# Patient Record
Sex: MALE | Race: BLACK OR AFRICAN AMERICAN | NOT HISPANIC OR LATINO | Employment: FULL TIME | URBAN - METROPOLITAN AREA
[De-identification: names, ages, dates, MRNs, and addresses within clinical notes are randomized per-mention and may not be internally consistent; named-entity substitution may affect disease eponyms.]

---

## 2023-02-01 ENCOUNTER — HOSPITAL ENCOUNTER (EMERGENCY)
Facility: HOSPITAL | Age: 33
Discharge: HOME | End: 2023-02-01
Attending: EMERGENCY MEDICINE | Admitting: EMERGENCY MEDICINE

## 2023-02-01 VITALS
HEART RATE: 108 BPM | SYSTOLIC BLOOD PRESSURE: 168 MMHG | HEIGHT: 76 IN | BODY MASS INDEX: 38.36 KG/M2 | DIASTOLIC BLOOD PRESSURE: 68 MMHG | RESPIRATION RATE: 16 BRPM | WEIGHT: 315 LBS | OXYGEN SATURATION: 96 % | TEMPERATURE: 98.6 F

## 2023-02-01 DIAGNOSIS — L08.9 WOUND INFECTION: ICD-10-CM

## 2023-02-01 DIAGNOSIS — T25.212A PARTIAL THICKNESS BURN OF LEFT ANKLE, INITIAL ENCOUNTER: ICD-10-CM

## 2023-02-01 DIAGNOSIS — T14.8XXA WOUND INFECTION: ICD-10-CM

## 2023-02-01 PROCEDURE — 250N000009 HC RX 250: Performed by: EMERGENCY MEDICINE

## 2023-02-01 PROCEDURE — 250N000013 HC RX MED GY IP 250 OP 250 PS 637: Performed by: EMERGENCY MEDICINE

## 2023-02-01 PROCEDURE — 99283 EMERGENCY DEPT VISIT LOW MDM: CPT

## 2023-02-01 RX ORDER — CEPHALEXIN 500 MG/1
500 CAPSULE ORAL 3 TIMES DAILY
Qty: 21 CAPSULE | Refills: 0 | Status: SHIPPED | OUTPATIENT
Start: 2023-02-01 | End: 2023-09-03

## 2023-02-01 RX ORDER — CEPHALEXIN 500 MG/1
500 CAPSULE ORAL 3 TIMES DAILY
Qty: 21 CAPSULE | Refills: 0 | Status: SHIPPED | OUTPATIENT
Start: 2023-02-01 | End: 2023-02-01

## 2023-02-01 RX ORDER — SILVER SULFADIAZINE 10 MG/G
1 CREAM TOPICAL ONCE
Status: COMPLETED | OUTPATIENT
Start: 2023-02-01 | End: 2023-02-01

## 2023-02-01 RX ORDER — CEPHALEXIN 500 MG/1
500 CAPSULE ORAL ONCE
Status: COMPLETED | OUTPATIENT
Start: 2023-02-01 | End: 2023-02-01

## 2023-02-01 RX ORDER — GINSENG 100 MG
CAPSULE ORAL ONCE
Status: COMPLETED | OUTPATIENT
Start: 2023-02-01 | End: 2023-02-01

## 2023-02-01 RX ADMIN — CEPHALEXIN 500 MG: 500 CAPSULE ORAL at 22:26

## 2023-02-01 RX ADMIN — BACITRACIN: 500 OINTMENT TOPICAL at 22:26

## 2023-02-01 RX ADMIN — SILVER SULFADIAZINE 1 G: 10 CREAM TOPICAL at 22:26

## 2023-02-01 ASSESSMENT — ENCOUNTER SYMPTOMS
FEVER: 0
CHILLS: 0
WOUND: 1
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
## 2023-02-02 NOTE — DISCHARGE INSTRUCTIONS
Please take antibiotics as prescribed.  Continue to keep the area clean, bandaged.  You may use Neosporin or bacitracin on the burn area.  Continue to monitor for any signs of worsening infection.  Return for any new or worsening symptoms.

## 2023-09-03 ENCOUNTER — APPOINTMENT (OUTPATIENT)
Dept: CT IMAGING | Facility: HOSPITAL | Age: 33
End: 2023-09-03
Attending: STUDENT IN AN ORGANIZED HEALTH CARE EDUCATION/TRAINING PROGRAM

## 2023-09-03 ENCOUNTER — APPOINTMENT (OUTPATIENT)
Dept: RADIOLOGY | Facility: HOSPITAL | Age: 33
End: 2023-09-03
Attending: STUDENT IN AN ORGANIZED HEALTH CARE EDUCATION/TRAINING PROGRAM

## 2023-09-03 ENCOUNTER — HOSPITAL ENCOUNTER (EMERGENCY)
Facility: HOSPITAL | Age: 33
Discharge: HOME | End: 2023-09-03
Attending: STUDENT IN AN ORGANIZED HEALTH CARE EDUCATION/TRAINING PROGRAM | Admitting: STUDENT IN AN ORGANIZED HEALTH CARE EDUCATION/TRAINING PROGRAM

## 2023-09-03 VITALS
RESPIRATION RATE: 26 BRPM | WEIGHT: 315 LBS | HEART RATE: 96 BPM | HEIGHT: 72 IN | SYSTOLIC BLOOD PRESSURE: 132 MMHG | OXYGEN SATURATION: 95 % | DIASTOLIC BLOOD PRESSURE: 72 MMHG | BODY MASS INDEX: 42.66 KG/M2 | TEMPERATURE: 98.7 F

## 2023-09-03 DIAGNOSIS — M54.50 ACUTE BILATERAL LOW BACK PAIN, UNSPECIFIED WHETHER SCIATICA PRESENT: ICD-10-CM

## 2023-09-03 LAB
ALBUMIN SERPL BCG-MCNC: 4.1 G/DL (ref 3.5–5.2)
ALBUMIN UR-MCNC: 20 MG/DL
ALP SERPL-CCNC: 56 U/L (ref 40–129)
ALT SERPL W P-5'-P-CCNC: 32 U/L (ref 0–70)
ANION GAP SERPL CALCULATED.3IONS-SCNC: 11 MMOL/L (ref 7–15)
APPEARANCE UR: CLEAR
AST SERPL W P-5'-P-CCNC: 34 U/L (ref 0–45)
BACTERIA #/AREA URNS HPF: ABNORMAL /HPF
BASOPHILS # BLD AUTO: 0 10E3/UL (ref 0–0.2)
BASOPHILS NFR BLD AUTO: 1 %
BILIRUB SERPL-MCNC: 0.2 MG/DL
BILIRUB UR QL STRIP: NEGATIVE
BUN SERPL-MCNC: 12.2 MG/DL (ref 6–20)
CALCIUM SERPL-MCNC: 9.2 MG/DL (ref 8.6–10)
CHLORIDE SERPL-SCNC: 103 MMOL/L (ref 98–107)
COLOR UR AUTO: YELLOW
CREAT SERPL-MCNC: 0.93 MG/DL (ref 0.67–1.17)
DEPRECATED HCO3 PLAS-SCNC: 28 MMOL/L (ref 22–29)
EOSINOPHIL # BLD AUTO: 0.2 10E3/UL (ref 0–0.7)
EOSINOPHIL NFR BLD AUTO: 2 %
ERYTHROCYTE [DISTWIDTH] IN BLOOD BY AUTOMATED COUNT: 14.2 % (ref 10–15)
GFR SERPL CREATININE-BSD FRML MDRD: >90 ML/MIN/1.73M2
GLUCOSE SERPL-MCNC: 106 MG/DL (ref 70–99)
GLUCOSE UR STRIP-MCNC: NEGATIVE MG/DL
HCT VFR BLD AUTO: 44 % (ref 40–53)
HGB BLD-MCNC: 14.1 G/DL (ref 13.3–17.7)
HGB UR QL STRIP: NEGATIVE
HYALINE CASTS: 1 /LPF
IMM GRANULOCYTES # BLD: 0 10E3/UL
IMM GRANULOCYTES NFR BLD: 0 %
KETONES UR STRIP-MCNC: ABNORMAL MG/DL
LEUKOCYTE ESTERASE UR QL STRIP: ABNORMAL
LYMPHOCYTES # BLD AUTO: 2.6 10E3/UL (ref 0.8–5.3)
LYMPHOCYTES NFR BLD AUTO: 33 %
MCH RBC QN AUTO: 27.2 PG (ref 26.5–33)
MCHC RBC AUTO-ENTMCNC: 32 G/DL (ref 31.5–36.5)
MCV RBC AUTO: 85 FL (ref 78–100)
MONOCYTES # BLD AUTO: 0.4 10E3/UL (ref 0–1.3)
MONOCYTES NFR BLD AUTO: 6 %
MUCOUS THREADS #/AREA URNS LPF: PRESENT /LPF
NEUTROPHILS # BLD AUTO: 4.6 10E3/UL (ref 1.6–8.3)
NEUTROPHILS NFR BLD AUTO: 58 %
NITRATE UR QL: NEGATIVE
NRBC # BLD AUTO: 0 10E3/UL
NRBC BLD AUTO-RTO: 0 /100
PH UR STRIP: 6 [PH] (ref 5–7)
PLATELET # BLD AUTO: 341 10E3/UL (ref 150–450)
POTASSIUM SERPL-SCNC: 4.4 MMOL/L (ref 3.4–5.3)
PROT SERPL-MCNC: 7.4 G/DL (ref 6.4–8.3)
RBC # BLD AUTO: 5.18 10E6/UL (ref 4.4–5.9)
RBC URINE: 5 /HPF
SODIUM SERPL-SCNC: 142 MMOL/L (ref 136–145)
SP GR UR STRIP: 1.04 (ref 1–1.03)
SQUAMOUS EPITHELIAL: 1 /HPF
TROPONIN T SERPL HS-MCNC: <6 NG/L
UROBILINOGEN UR STRIP-MCNC: 3 MG/DL
WBC # BLD AUTO: 7.8 10E3/UL (ref 4–11)
WBC URINE: 7 /HPF

## 2023-09-03 PROCEDURE — 36415 COLL VENOUS BLD VENIPUNCTURE: CPT | Performed by: STUDENT IN AN ORGANIZED HEALTH CARE EDUCATION/TRAINING PROGRAM

## 2023-09-03 PROCEDURE — 71046 X-RAY EXAM CHEST 2 VIEWS: CPT

## 2023-09-03 PROCEDURE — 74177 CT ABD & PELVIS W/CONTRAST: CPT

## 2023-09-03 PROCEDURE — 80053 COMPREHEN METABOLIC PANEL: CPT | Performed by: STUDENT IN AN ORGANIZED HEALTH CARE EDUCATION/TRAINING PROGRAM

## 2023-09-03 PROCEDURE — 85025 COMPLETE CBC W/AUTO DIFF WBC: CPT | Performed by: STUDENT IN AN ORGANIZED HEALTH CARE EDUCATION/TRAINING PROGRAM

## 2023-09-03 PROCEDURE — 96361 HYDRATE IV INFUSION ADD-ON: CPT

## 2023-09-03 PROCEDURE — 81001 URINALYSIS AUTO W/SCOPE: CPT | Performed by: STUDENT IN AN ORGANIZED HEALTH CARE EDUCATION/TRAINING PROGRAM

## 2023-09-03 PROCEDURE — 87086 URINE CULTURE/COLONY COUNT: CPT | Performed by: STUDENT IN AN ORGANIZED HEALTH CARE EDUCATION/TRAINING PROGRAM

## 2023-09-03 PROCEDURE — 84484 ASSAY OF TROPONIN QUANT: CPT | Performed by: STUDENT IN AN ORGANIZED HEALTH CARE EDUCATION/TRAINING PROGRAM

## 2023-09-03 PROCEDURE — 93005 ELECTROCARDIOGRAM TRACING: CPT | Performed by: STUDENT IN AN ORGANIZED HEALTH CARE EDUCATION/TRAINING PROGRAM

## 2023-09-03 PROCEDURE — 99285 EMERGENCY DEPT VISIT HI MDM: CPT | Mod: 25

## 2023-09-03 PROCEDURE — 250N000011 HC RX IP 250 OP 636: Performed by: STUDENT IN AN ORGANIZED HEALTH CARE EDUCATION/TRAINING PROGRAM

## 2023-09-03 PROCEDURE — 250N000011 HC RX IP 250 OP 636: Mod: JZ | Performed by: STUDENT IN AN ORGANIZED HEALTH CARE EDUCATION/TRAINING PROGRAM

## 2023-09-03 PROCEDURE — 258N000003 HC RX IP 258 OP 636: Performed by: STUDENT IN AN ORGANIZED HEALTH CARE EDUCATION/TRAINING PROGRAM

## 2023-09-03 PROCEDURE — 96365 THER/PROPH/DIAG IV INF INIT: CPT | Mod: 59

## 2023-09-03 PROCEDURE — 96375 TX/PRO/DX INJ NEW DRUG ADDON: CPT

## 2023-09-03 RX ORDER — IOPAMIDOL 755 MG/ML
100 INJECTION, SOLUTION INTRAVASCULAR ONCE
Status: COMPLETED | OUTPATIENT
Start: 2023-09-03 | End: 2023-09-03

## 2023-09-03 RX ORDER — KETOROLAC TROMETHAMINE 15 MG/ML
15 INJECTION, SOLUTION INTRAMUSCULAR; INTRAVENOUS ONCE
Status: COMPLETED | OUTPATIENT
Start: 2023-09-03 | End: 2023-09-03

## 2023-09-03 RX ORDER — CEPHALEXIN 500 MG/1
500 CAPSULE ORAL 4 TIMES DAILY
Qty: 40 CAPSULE | Refills: 0 | Status: SHIPPED | OUTPATIENT
Start: 2023-09-03 | End: 2023-09-13

## 2023-09-03 RX ORDER — CEFTRIAXONE 1 G/1
1 INJECTION, POWDER, FOR SOLUTION INTRAMUSCULAR; INTRAVENOUS ONCE
Status: COMPLETED | OUTPATIENT
Start: 2023-09-03 | End: 2023-09-03

## 2023-09-03 RX ADMIN — KETOROLAC TROMETHAMINE 15 MG: 15 INJECTION INTRAMUSCULAR; INTRAVENOUS at 03:53

## 2023-09-03 RX ADMIN — IOPAMIDOL 90 ML: 755 INJECTION, SOLUTION INTRAVENOUS at 05:09

## 2023-09-03 RX ADMIN — CEFTRIAXONE SODIUM 1 G: 1 INJECTION, POWDER, FOR SOLUTION INTRAMUSCULAR; INTRAVENOUS at 05:17

## 2023-09-03 RX ADMIN — SODIUM CHLORIDE 1000 ML: 9 INJECTION, SOLUTION INTRAVENOUS at 03:54

## 2023-09-03 ASSESSMENT — ACTIVITIES OF DAILY LIVING (ADL)
ADLS_ACUITY_SCORE: 35
ADLS_ACUITY_SCORE: 35

## 2023-09-03 NOTE — ED PROVIDER NOTES
NAME: Nakul Kerr  AGE: 33 year old male  YOB: 1990  MRN: [id number]  EVALUATION DATE & TIME: No admission date for patient encounter.    PCP: No Ref-Primary, Physician  ED PROVIDER: Chen Spears MD.    Chief Complaint   Patient presents with    Back Pain    Arm Pain     FINAL IMPRESSION:  1. Acute bilateral low back pain, unspecified whether sciatica present      MEDICAL DECISION MAKIN:28 AM I met with the patient, obtained history, performed an initial exam, and discussed options and plan for diagnostics and treatment here in the ED.     MDM: 32 y/o M who presents with back pain. Patient reports about a week of back pain, he says below his shoulder blades but on exam location is really more across his low back in paraspinal muscles, no midline tenderness. He mentions occasional shoots of pain in his right collar bone/axilla and some urinary frequency.    UA shows findings of possible infection. CT abd/pelvis without kidney stone or other acute findings. EKG is sinus rhythm without ischemic changes, troponin <6, low suspicion for ACS. CXR without pneumothorax, infiltrate or widened mediastinum (and with nature of pain and equal pulses doubt dissection). PERC negative, no tachycardia/hypoxia/signs of DVT doubt PE. Lab work is reassuring. Regarding the back pain - dx includes muscular strain or spasm, degenerative joint disease, acute herniated disc, sciatica, fracture, epidural abscess, discitis, osteomyelitis, malignancy among others. Given reassuring neuro exam here, and no red flags do not feel warrants emergent imaging such as Mri at this time.    Patient feels comfortable with discharge with close follow up with PCP. Spinal referral placed. Strict return precautions discussed and patient is in agreement with plan, endorses understanding and their questions were answered.    Medical Decision Making    History:  Supplemental history from: Documented in chart, if applicable  External  "Record(s) reviewed: Documented in chart, if applicable.    Work Up:  Chart documentation includes differential considered and any EKGs or imaging interpreted by provider.  In additional to work up documented, I considered the following work up: Documented in chart, if applicable.    External consultation:  Discussion of management with another provider: Documented in chart, if applicable    Complicating factors:  Care impacted by chronic illness: N/A  Care affected by social determinants of health: N/A    Disposition considerations: Discharge. I prescribed additional prescription strength medication(s) as charted. I considered admission, but ultimately discharged patient given reassuring exam and vitals.    MEDICATIONS GIVEN IN THE EMERGENCY:  Medications   ketorolac (TORADOL) injection 15 mg (15 mg Intravenous $Given 9/3/23 0353)   0.9% sodium chloride BOLUS (0 mLs Intravenous Stopped 9/3/23 0550)   cefTRIAXone (ROCEPHIN) 1 g vial to attach to  mL bag for ADULTS or NS 50 mL bag for PEDS (0 g Intravenous Stopped 9/3/23 0548)   iopamidol (ISOVUE-370) solution 100 mL (90 mLs Intravenous $Given 9/3/23 0509)       NEW PRESCRIPTIONS STARTED AT TODAY'S ER VISIT:  Discharge Medication List as of 9/3/2023  5:51 AM           =================================================================  HPI    Patient information was obtained from: the patient  Use of : N/A     Nakul Kerr is a 33 year old male with history of obesity who presents back pain and arm pain.    Patient presents with bilateral back pain under shoulder blades that began one week ago. His pain is worse on his left side. No known falls or heavy lifting. The patient notes occasional shoots of pain by his right collar bone/axilla. He has been taking Tylenol with mild relief. He states that his chest has been feeling \"airy\" and \"spacey\" from time to time with his last episode being a couple of weeks ago. He states that he has had some " abdominal pain that is similar to when he hasn't ate in a while. Patient denies saddle anesthesia, bowel/bladder incontinence or changes, numbness. He does have some increased urinary frequency.Patient doesn't see a physician regularly and hasn't been to the doctor in quite some time. He additionally denies fevers, cough, or other symptoms.    REVIEW OF SYSTEMS   All systems reviewed, please see HPI for pertinent findings    PAST MEDICAL HISTORY:  History reviewed. No pertinent past medical history.    PAST SURGICAL HISTORY:  History reviewed. No pertinent surgical history.    CURRENT MEDICATIONS:    No current facility-administered medications for this encounter.    Current Outpatient Medications:     cephALEXin (KEFLEX) 500 MG capsule, Take 1 capsule (500 mg) by mouth 4 times daily for 10 days, Disp: 40 capsule, Rfl: 0    ALLERGIES:  Allergies   Allergen Reactions    Banana     Cats        FAMILY HISTORY:  No family history on file.    SOCIAL HISTORY:   Social History     Socioeconomic History    Marital status: Single   Tobacco Use    Smoking status: Never       PHYSICAL EXAM:    Vitals: /72   Pulse 96   Temp 98.7  F (37.1  C) (Oral)   Resp 26   Ht 1.829 m (6')   Wt (!) 158.8 kg (350 lb)   SpO2 95%   BMI 47.47 kg/m     Constitutional: Well developed, in no acute distress  HENT: Normocephalic, atraumatic. Neck-gross ROM intact.   Eyes: Pupils mid-range, sclera white  Respiratory: CTAB, no respiratory distress, speaks full sentences easily.  Cardiovascular: Normal heart rate, regular rhythm. No lower extremity edema. Extremities warm/well perfused  GI: Soft, not distended, not tender to palpation  Musculoskeletal: Moving all 4 extremities intentionally and without pain. No obvious deformity.  Skin: Warm, dry, no rash.  Back: no midline spinal tenderness or deformities, bilateral lumbar paraspinal muscle tenderness   Neurologic: Alert & oriented, speech clear, Cns grossly intact, stregnth/sensation  intact in the extremities    LAB:  All pertinent labs reviewed and interpreted.  Labs Ordered and Resulted from Time of ED Arrival to Time of ED Departure   COMPREHENSIVE METABOLIC PANEL - Abnormal       Result Value    Sodium 142      Potassium 4.4      Chloride 103      Carbon Dioxide (CO2) 28      Anion Gap 11      Urea Nitrogen 12.2      Creatinine 0.93      Calcium 9.2      Glucose 106 (*)     Alkaline Phosphatase 56      AST 34      ALT 32      Protein Total 7.4      Albumin 4.1      Bilirubin Total 0.2      GFR Estimate >90     ROUTINE UA WITH MICROSCOPIC REFLEX TO CULTURE - Abnormal    Color Urine Yellow      Appearance Urine Clear      Glucose Urine Negative      Bilirubin Urine Negative      Ketones Urine Trace (*)     Specific Gravity Urine 1.036 (*)     Blood Urine Negative      pH Urine 6.0      Protein Albumin Urine 20 (*)     Urobilinogen Urine 3.0 (*)     Nitrite Urine Negative      Leukocyte Esterase Urine 75 Ej/uL (*)     Bacteria Urine Few (*)     Mucus Urine Present (*)     RBC Urine 5 (*)     WBC Urine 7 (*)     Squamous Epithelials Urine 1      Hyaline Casts Urine 1     TROPONIN T, HIGH SENSITIVITY - Normal    Troponin T, High Sensitivity <6     CBC WITH PLATELETS AND DIFFERENTIAL    WBC Count 7.8      RBC Count 5.18      Hemoglobin 14.1      Hematocrit 44.0      MCV 85      MCH 27.2      MCHC 32.0      RDW 14.2      Platelet Count 341      % Neutrophils 58      % Lymphocytes 33      % Monocytes 6      % Eosinophils 2      % Basophils 1      % Immature Granulocytes 0      NRBCs per 100 WBC 0      Absolute Neutrophils 4.6      Absolute Lymphocytes 2.6      Absolute Monocytes 0.4      Absolute Eosinophils 0.2      Absolute Basophils 0.0      Absolute Immature Granulocytes 0.0      Absolute NRBCs 0.0         RADIOLOGY:  CT Abdomen Pelvis w Contrast   Final Result   IMPRESSION:    No acute process in the abdomen or pelvis.      Chest XR,  PA & LAT   Final Result   IMPRESSION: Heart size is normal.  Lungs are clear. No pneumothorax or pleural effusion.          EKG:   Performed at: 2:13:04  Impression:   Normal sinus rhythm  Low voltage QRS  Borderline ECG  Rate: 90 bpm  Rhythm: Sinus  QRS Interval: 92 ms  QTc Interval: 425 ms  Comparison: none  I have independently reviewed and interpreted the EKG(s) documented above.     PROCEDURES:   Procedures     I, Danie Amaral, am serving as a scribe to document services personally performed by Dr. Chen Spears based on my observation and the provider's statements to me. I, Chen Spears MD attest that Danie Amaral is acting in a scribe capacity, has observed my performance of the services and has documented them in accordance with my direction.    Chen Spears M.D.  Emergency Medicine  [facility] EMERGENCY DEPARTMENT  [address] [place] [id number]  [phone]  Dept: [phone]       Chen Spears MD  09/03/23 1943

## 2023-09-03 NOTE — DISCHARGE INSTRUCTIONS
For pain:  You can take 650 mg of tylenol every 6-8 hours (no more than 3000 mg in 24 hours).  You can take 400 mg of ibuprofen with food every 6-8 hours (no more than 3200 mg in 24 hours).     We are going to start you on antibiotics for a urinary infection    Follow up closely with a primary care doctor. You can also follow up with the spine center     Return to the Emergency Room with worsening back pain, chest pain, difficulty breathing, bowel/bladder changes, focal numbness/weakness, fevers or other worsening symptoms or concerns

## 2023-09-03 NOTE — ED TRIAGE NOTES
Pt complains of back pain bilaterally under shoulder blades that started a week ago. Pt also states his right collar bone hurts and goes down his right arm. Pt has been taking tylenol for the pain which helps a little bit. Pain hurts worse when pt is laying down.

## 2023-09-04 LAB
ATRIAL RATE - MUSE: 90 BPM
DIASTOLIC BLOOD PRESSURE - MUSE: NORMAL MMHG
INTERPRETATION ECG - MUSE: NORMAL
P AXIS - MUSE: 44 DEGREES
PR INTERVAL - MUSE: 134 MS
QRS DURATION - MUSE: 92 MS
QT - MUSE: 348 MS
QTC - MUSE: 425 MS
R AXIS - MUSE: 56 DEGREES
SYSTOLIC BLOOD PRESSURE - MUSE: NORMAL MMHG
T AXIS - MUSE: 42 DEGREES
VENTRICULAR RATE- MUSE: 90 BPM

## 2023-09-05 LAB — BACTERIA UR CULT: NORMAL

## 2024-08-18 ENCOUNTER — HOSPITAL ENCOUNTER (EMERGENCY)
Facility: HOSPITAL | Age: 34
Discharge: HOME | End: 2024-08-18
Payer: COMMERCIAL

## 2024-08-18 ENCOUNTER — APPOINTMENT (OUTPATIENT)
Dept: RADIOLOGY | Facility: HOSPITAL | Age: 34
End: 2024-08-18
Payer: COMMERCIAL

## 2024-08-18 VITALS
HEIGHT: 73 IN | SYSTOLIC BLOOD PRESSURE: 122 MMHG | HEART RATE: 86 BPM | WEIGHT: 315 LBS | TEMPERATURE: 98.4 F | BODY MASS INDEX: 41.75 KG/M2 | DIASTOLIC BLOOD PRESSURE: 86 MMHG | RESPIRATION RATE: 12 BRPM | OXYGEN SATURATION: 96 %

## 2024-08-18 DIAGNOSIS — S80.212A KNEE ABRASION, LEFT, INITIAL ENCOUNTER: ICD-10-CM

## 2024-08-18 DIAGNOSIS — M17.12 OSTEOARTHRITIS OF LEFT KNEE, UNSPECIFIED OSTEOARTHRITIS TYPE: ICD-10-CM

## 2024-08-18 DIAGNOSIS — M25.462 EFFUSION OF LEFT KNEE JOINT: ICD-10-CM

## 2024-08-18 PROCEDURE — 99283 EMERGENCY DEPT VISIT LOW MDM: CPT

## 2024-08-18 PROCEDURE — 250N000013 HC RX MED GY IP 250 OP 250 PS 637

## 2024-08-18 PROCEDURE — 73562 X-RAY EXAM OF KNEE 3: CPT | Mod: LT

## 2024-08-18 RX ORDER — IBUPROFEN 600 MG/1
600 TABLET, FILM COATED ORAL ONCE
Status: COMPLETED | OUTPATIENT
Start: 2024-08-18 | End: 2024-08-18

## 2024-08-18 RX ORDER — NAPROXEN 500 MG/1
500 TABLET ORAL 2 TIMES DAILY WITH MEALS
Qty: 14 TABLET | Refills: 0 | Status: SHIPPED | OUTPATIENT
Start: 2024-08-18 | End: 2024-08-25

## 2024-08-18 RX ORDER — ACETAMINOPHEN 325 MG/1
975 TABLET ORAL ONCE
Status: COMPLETED | OUTPATIENT
Start: 2024-08-18 | End: 2024-08-18

## 2024-08-18 RX ORDER — ACETAMINOPHEN 500 MG
500-1000 TABLET ORAL EVERY 6 HOURS PRN
Qty: 50 TABLET | Refills: 0 | Status: SHIPPED | OUTPATIENT
Start: 2024-08-18

## 2024-08-18 RX ADMIN — ACETAMINOPHEN 975 MG: 325 TABLET ORAL at 12:00

## 2024-08-18 RX ADMIN — IBUPROFEN 600 MG: 600 TABLET, FILM COATED ORAL at 12:00

## 2024-08-18 ASSESSMENT — ACTIVITIES OF DAILY LIVING (ADL)
ADLS_ACUITY_SCORE: 35
ADLS_ACUITY_SCORE: 35

## 2024-08-18 ASSESSMENT — COLUMBIA-SUICIDE SEVERITY RATING SCALE - C-SSRS
6. HAVE YOU EVER DONE ANYTHING, STARTED TO DO ANYTHING, OR PREPARED TO DO ANYTHING TO END YOUR LIFE?: NO
1. IN THE PAST MONTH, HAVE YOU WISHED YOU WERE DEAD OR WISHED YOU COULD GO TO SLEEP AND NOT WAKE UP?: NO
2. HAVE YOU ACTUALLY HAD ANY THOUGHTS OF KILLING YOURSELF IN THE PAST MONTH?: NO

## 2024-08-18 NOTE — ED PROVIDER NOTES
Emergency Department Encounter  [facility] EMERGENCY DEPARTMENT  Nakul Kerr   AGE: 34 year old SEX: male  YOB: 1990  MRN: [id number]      EVALUATION DATE & TIME: 8/18/2024 11:16 AM ; PCP: No Ref-Primary, Physician   ED PROVIDER: Hellen Ramos PA-C    CHIEF COMPLAINT: Knee Injury      MEDICAL DECISION MAKING   Nakul Kerr is an otherwise healthy 34-year-old male who presents to the ED for evaluation of left knee injury that occurred yesterday when patient was at work and tripped and hit his left knee on the ground resulting in an abrasion and continued left knee pain.  No head injury or loss of consciousness.  Patient able to ambulate immediately after the injury.  No fevers or chills.    Vitals reviewed and hemodynamically stable, afebrile.  On exam, patient is morbidly obese and nontoxic.  Left knee is swollen but not hot.  There is tenderness along the lateral joint line and patella.  Patient able to actively flex and extend left knee.  Distal left extremity warm with pulses intact.  Negative Homans' sign.    The exact cause of patient's left knee pain remains unknown but based on evaluation today, symptoms not appear to be due to a cause require emergent intervention at this time.  XR imaging of left knee does reveal a moderate joint effusion as well as tricompartmental degenerative arthritis, I suspect soft tissue injury and chronic degenerative changes as origin of pain.  Superficial abrasion cleansed by nursing staff and covered with nonadherent gauze.  Affected knee placed in compression ACE wrap.  Considered, but doubt based on evaluation today, significant ligamentous injury, quadriceps tendon rupture, gout/pseudogout, septic joint, DVT, or gonococcal arthritis.    Patient is otherwise well appearing, hemodynamically stable, and without evidence of neurovascular injury or compartment syndrome, plan to discharge home. Reccommended symptomatic care  including rest, ice, elevation and 7-day course of naproxen twice daily with acetaminophen as needed.  Patient was provided with clear, written instructions of potential danger signs and where and when to return for emergency care or re-evaluation. Orthopedic evaluation recommended if symptoms do not improve in 5 days, [facility] contact information provided.    Medical Decision Making  Obtained supplemental history:Supplemental history obtained?: No  Reviewed external records: External records reviewed?: No  Care impacted by chronic illness:N/A  Care significantly affected by social determinants of health:N/A  Did you consider but not order tests?: Work up considered but not performed and documented in chart, if applicable  Did you interpret images independently?: Independent interpretation of ECG and images noted in documentation, when applicable.  Consultation discussion with other provider:Did you involve another provider (consultant, , pharmacy, etc.)?: No  Discharge. I prescribed additional prescription strength medication(s) as charted. See documentation for any additional details.    FINAL IMPRESSION       ICD-10-CM    1. Effusion of left knee joint  M25.462       2. Osteoarthritis of left knee, unspecified osteoarthritis type  M17.12     LEFT KNEE XR 08/18: Tricompartmental degenerative arthritis, moderate to severe in the patellofemoral compartment.      3. Knee abrasion, left, initial encounter  S80.212A           ED COURSE   11:49 AM Med student (Chana) saw patient.  12:27 PM I met and introduced myself to the patient. I gathered initial history and performed my physical exam.   12:59 PM We discussed results, discharge, follow up, and reasons to return to the ED.     MEDICATIONS GIVEN IN THE EMERGENCY DEPARTMENT:   Medications   ibuprofen (ADVIL/MOTRIN) tablet 600 mg (600 mg Oral $Given 8/18/24 1200)   acetaminophen (TYLENOL) tablet 975 mg (975 mg Oral $Given 8/18/24 1200)      NEW  "PRESCRIPTIONS STARTED AT TODAY'S ED VISIT:  Discharge Medication List as of 8/18/2024  1:18 PM        START taking these medications    Details   acetaminophen (TYLENOL) 500 MG tablet Take 1-2 tablets (500-1,000 mg) by mouth every 6 hours as needed for pain, Disp-50 tablet, R-0, Local Print      naproxen (NAPROSYN) 500 MG tablet Take 1 tablet (500 mg) by mouth 2 times daily (with meals) for 7 days for knee pain and inflammation, Disp-14 tablet, R-0, Local Print                 =================================================================         HISTORY OF PRESENT ILLNESS   Patient information was obtained from: patient   Use of Intrepreter: N/A     Nakul Kerr is an otherwise healthy 34-year-old male who presents to the emergency department by private vehicle for evaluation of left knee injury.  Yesterday, while at work he reports he tripped and hit his left knee on the ground.  Denies any head injury or loss of consciousness.  After fall, patient noticed skin changes over the knee.  Patient able to ambulate immediately after injury.  Pain when walking 9/10.  No medications taken prior to arrival.  No fevers, chills, nausea, or vomiting.  No history of blood clots.  No calf pain.    MEDICAL HISTORY     No past medical history on file.    No past surgical history on file.    No family history on file.    Social History     Tobacco Use    Smoking status: Never       Most Recent Immunizations   Administered Date(s) Administered    COVID-19 MONOVALENT 12+ ([employer]) 05/17/2021        acetaminophen (TYLENOL) 500 MG tablet  naproxen (NAPROSYN) 500 MG tablet        PHYSICAL EXAM   VITALS:  Patient Vitals for the past 24 hrs:   BP Temp Temp src Pulse Resp SpO2 Height Weight   08/18/24 1113 122/86 98.4  F (36.9  C) Tympanic 86 12 96 % 1.854 m (6' 1\") (!) 217.7 kg (480 lb)     Body mass index is 63.33 kg/m .     Vitals reviewed. Nursing notes reviewed.  CONSITUTIONAL: Generally well-appearing male , in no acute " distress. Well developed, nourished.  EYES: Conjunctivae clear, no discharge. EOM grossly intact.  HENT: Normocephalic, atraumatic, mucous membranes moist, nose normal.  NECK: Supple, gross ROM intact.   RESPIRATORY: Normal work of breathing, normal rate, speaks in full sentences.  CARDIO: Normal heart rate.  GI: Nondistended.   MSK:   Left extremity: Knee is swollen with overlying superficial abrasion without warmth. Tenderness along lateral joint line and patella.  No tenderness along patellar tendon, pes anserine bursae, and iliotibial band.  Full active ROM of left knee.  No joint instability, negative anterior and posterior drawer. Sensation intact to posterolateral calf, dorsal foot, and plantar foot. Distal left lower extremity warm with pulses intact. No unilateral calf pain, swelling, or tenderness.   MSK exam is otherwise unremarkable  NEURO:  AxOx4. CN II-XII grossly intact, but not individually tested. No focal neurological deficits.   PSYCH: Thoughts linear and responses appropriate. Cooperative. Appropriate mood and affect.     LABS AND IMAGING   All pertinent labs reviewed and interpreted  Labs Ordered and Resulted from Time of ED Arrival to Time of ED Departure - No data to display    XR Knee Left 3 Views   Final Result   IMPRESSION: Normal alignment. No acute fracture. Tricompartmental degenerative arthritis, moderate to severe in the patellofemoral compartment. Small to moderate knee joint effusion.        Hellen Ramos PA-C   Emergency Medicine   [facility] EMERGENCY DEPARTMENT  [facility] [facility] [address]  [phone]  Dept: [phone]      Hellen Ramos PA-C  08/18/24 9951

## 2024-08-18 NOTE — ED TRIAGE NOTES
Pt tripped yesterday at 3pm and hit his left knee on cement.  Did not hit head.  Pt is here today as he has a laceration on his left knee that he states is about 2 inches long and it has gotten red on his knee around the lac.  Pt also c/o left lateral knee pain and some inner left knee pain when he walks rated 9/10.

## 2024-08-18 NOTE — DISCHARGE INSTRUCTIONS
Take the prescribed medication as directed. DO NOT TAKE ADDITIONAL NSAIDs (ibuprofen, aleve) WHILE TAKING NAPROXEN.  Follow RICE for the first 48 hours:  Rest and protect the injured or sore area.  Ice or a cold pack used as soon as possible.  Compression, or wrapping the injured or sore area with an elastic bandage.  Elevation (propping up) the injured or sore area.  If symptoms are not improving within 5 days with the above interventions, follow up with [facility].     Call 911 anytime you think you may need emergency care. For example, call if:    You have symptoms of a blood clot in your lung (called a pulmonary embolism). These may include:  Sudden chest pain.  Trouble breathing.  Coughing up blood.   Call your doctor now or seek immediate medical care if:    You have severe or increasing pain.     Your leg or foot turns cold or changes color.     You cannot stand or put weight on your knee.     Your knee looks twisted or bent out of shape.     You cannot move your knee.     You have signs of infection, such as:  Increased pain, swelling, warmth, or redness.  Red streaks leading from the knee.  Pus draining from a place on your knee.  A fever.     You have signs of a blood clot in your leg (called a deep vein thrombosis), such as:  Pain in your calf, back of the knee, thigh, or groin.  Redness and swelling in your leg or groin.   Watch closely for changes in your health, and be sure to contact your doctor if:    You have tingling, weakness, or numbness in your knee.     You have any new symptoms, such as swelling.     You have bruises from a knee injury that last longer than 2 weeks.     You do not get better as expected.     We are always happy to help you out here at [facility] EMERGENCY DEPARTMENT. Take care.

## 2024-08-18 NOTE — Clinical Note
Nakul Kerr was seen and treated in our emergency department on 8/18/2024.  He may return to work on 08/21/2024.       If you have any questions or concerns, please don't hesitate to call.      Hellen Ramos PA-C